# Patient Record
Sex: FEMALE | Race: WHITE | NOT HISPANIC OR LATINO | Employment: FULL TIME | URBAN - NONMETROPOLITAN AREA
[De-identification: names, ages, dates, MRNs, and addresses within clinical notes are randomized per-mention and may not be internally consistent; named-entity substitution may affect disease eponyms.]

---

## 2022-02-21 ENCOUNTER — OFFICE VISIT (OUTPATIENT)
Dept: FAMILY MEDICINE | Facility: CLINIC | Age: 47
End: 2022-02-21
Payer: COMMERCIAL

## 2022-02-21 ENCOUNTER — HOSPITAL ENCOUNTER (OUTPATIENT)
Dept: RADIOLOGY | Facility: HOSPITAL | Age: 47
Discharge: HOME | End: 2022-02-21
Attending: NURSE PRACTITIONER
Payer: COMMERCIAL

## 2022-02-21 VITALS — WEIGHT: 170 LBS | BODY MASS INDEX: 26.68 KG/M2 | HEIGHT: 67 IN

## 2022-02-21 DIAGNOSIS — Z20.822 CLOSE EXPOSURE TO COVID-19 VIRUS: ICD-10-CM

## 2022-02-21 DIAGNOSIS — M54.41 ACUTE BILATERAL LOW BACK PAIN WITH RIGHT-SIDED SCIATICA: ICD-10-CM

## 2022-02-21 DIAGNOSIS — M54.41 ACUTE BILATERAL LOW BACK PAIN WITH RIGHT-SIDED SCIATICA: Primary | ICD-10-CM

## 2022-02-21 LAB
BILIRUB SERPL-MCNC: NEGATIVE MG/DL
BLOOD URINE, POC: NEGATIVE
COLOR, POC UA: NORMAL
CTP QC/QA: YES
GLUCOSE UR QL STRIP: NEGATIVE
KETONES UR QL STRIP: NEGATIVE
LEUKOCYTE ESTERASE URINE, POC: NEGATIVE
NITRITE, POC UA: NEGATIVE
PH, POC UA: 6
PROTEIN, POC: NEGATIVE
SARS-COV-2 AG RESP QL IA.RAPID: NEGATIVE
SPECIFIC GRAVITY, POC UA: 1.01
UROBILINOGEN, POC UA: 0.2

## 2022-02-21 PROCEDURE — 87426 SARS CORONAVIRUS 2 ANTIGEN POCT: ICD-10-PCS | Mod: QW,,, | Performed by: NURSE PRACTITIONER

## 2022-02-21 PROCEDURE — 72100 X-RAY EXAM L-S SPINE 2/3 VWS: CPT | Mod: TC

## 2022-02-21 PROCEDURE — 81003 POCT URINALYSIS W/O SCOPE: ICD-10-PCS | Mod: QW,,, | Performed by: NURSE PRACTITIONER

## 2022-02-21 PROCEDURE — 81003 URINALYSIS AUTO W/O SCOPE: CPT | Mod: QW,,, | Performed by: NURSE PRACTITIONER

## 2022-02-21 PROCEDURE — 87635 SARS-COV-2 COVID-19 AMP PRB: CPT | Mod: ,,, | Performed by: CLINICAL MEDICAL LABORATORY

## 2022-02-21 PROCEDURE — 96372 THER/PROPH/DIAG INJ SC/IM: CPT | Mod: ,,, | Performed by: NURSE PRACTITIONER

## 2022-02-21 PROCEDURE — 87635 SARS-COV-2 (COVID-19) QUALITATIVE PCR: ICD-10-PCS | Mod: ,,, | Performed by: CLINICAL MEDICAL LABORATORY

## 2022-02-21 PROCEDURE — 99204 OFFICE O/P NEW MOD 45 MIN: CPT | Mod: 25,,, | Performed by: NURSE PRACTITIONER

## 2022-02-21 PROCEDURE — 99204 PR OFFICE/OUTPT VISIT, NEW, LEVL IV, 45-59 MIN: ICD-10-PCS | Mod: 25,,, | Performed by: NURSE PRACTITIONER

## 2022-02-21 PROCEDURE — 96372 PR INJECTION,THERAP/PROPH/DIAG2ST, IM OR SUBCUT: ICD-10-PCS | Mod: ,,, | Performed by: NURSE PRACTITIONER

## 2022-02-21 PROCEDURE — 87426 SARSCOV CORONAVIRUS AG IA: CPT | Mod: QW,,, | Performed by: NURSE PRACTITIONER

## 2022-02-21 RX ORDER — METHYLPREDNISOLONE ACETATE 40 MG/ML
40 INJECTION, SUSPENSION INTRA-ARTICULAR; INTRALESIONAL; INTRAMUSCULAR; SOFT TISSUE
Status: COMPLETED | OUTPATIENT
Start: 2022-02-21 | End: 2022-02-21

## 2022-02-21 RX ORDER — KETOROLAC TROMETHAMINE 30 MG/ML
60 INJECTION, SOLUTION INTRAMUSCULAR; INTRAVENOUS
Status: COMPLETED | OUTPATIENT
Start: 2022-02-21 | End: 2022-02-21

## 2022-02-21 RX ORDER — FLUOXETINE HYDROCHLORIDE 20 MG/1
20 CAPSULE ORAL
COMMUNITY
Start: 2021-06-21

## 2022-02-21 RX ORDER — DEXAMETHASONE SODIUM PHOSPHATE 4 MG/ML
4 INJECTION, SOLUTION INTRA-ARTICULAR; INTRALESIONAL; INTRAMUSCULAR; INTRAVENOUS; SOFT TISSUE
Status: COMPLETED | OUTPATIENT
Start: 2022-02-21 | End: 2022-02-21

## 2022-02-21 RX ORDER — GABAPENTIN 100 MG/1
100 CAPSULE ORAL 3 TIMES DAILY PRN
Qty: 30 CAPSULE | Refills: 0 | Status: SHIPPED | OUTPATIENT
Start: 2022-02-21 | End: 2023-02-21

## 2022-02-21 RX ORDER — TELMISARTAN 20 MG/1
20 TABLET ORAL DAILY
COMMUNITY
Start: 2021-12-13

## 2022-02-21 RX ORDER — ALPRAZOLAM 0.5 MG/1
TABLET ORAL
COMMUNITY
Start: 2021-10-27

## 2022-02-21 RX ADMIN — DEXAMETHASONE SODIUM PHOSPHATE 4 MG: 4 INJECTION, SOLUTION INTRA-ARTICULAR; INTRALESIONAL; INTRAMUSCULAR; INTRAVENOUS; SOFT TISSUE at 12:02

## 2022-02-21 RX ADMIN — KETOROLAC TROMETHAMINE 60 MG: 30 INJECTION, SOLUTION INTRAMUSCULAR; INTRAVENOUS at 12:02

## 2022-02-21 RX ADMIN — METHYLPREDNISOLONE ACETATE 40 MG: 40 INJECTION, SUSPENSION INTRA-ARTICULAR; INTRALESIONAL; INTRAMUSCULAR; SOFT TISSUE at 12:02

## 2022-02-21 NOTE — LETTER
February 21, 2022      [facility]  [address] [place] [address]  Phone: [phone]  Fax: [phone]       Patient: Tran Piedra   YOB: 1975  Date of Visit: 02/21/2022    To Whom It May Concern:    KAMILAH Piedra  was at [facility] on 02/21/2022. The patient may return to work/school on 02/24/2022 with no restrictions. If you have any questions or concerns, or if I can be of further assistance, please do not hesitate to contact me.    Sincerely,    NANCY Rivers

## 2022-02-21 NOTE — PROGRESS NOTES
"   [facility] - FAMILY MEDICINE  Phone: [phone]       PATIENT NAME: Tran Piedra   : 1975    AGE: 46 y.o. DATE: 2022    MRN: [id number]        Reason for Visit / Chief Complaint:  exposure to covid-19  (Patient stated that she is here today for back. Patient stated that her pain started yesterday when she got out the shower and the started the in her lower back. Patient stated that her  tested positive for covid on two home test yesterday. )     Subjective:     HPI:  46 yr old WF presents to the office for low back pain since yesterday  Reports bilat low back pain, radiates around to the front and shoots down right leg  Denies any known injury  Also reports covid exposure -  is covid pos at home   States she works at [employer]     Review of Systems:    Pertinent items are above noted in HPI.    Review of patient's allergies indicates:   Allergen Reactions    Penicillins         Med List:  Current Outpatient Medications on File Prior to Visit   Medication Sig Dispense Refill    ALPRAZolam (XANAX) 0.5 MG tablet TAKE 1/2 TO 1 TABLET BY MOUTH TWICE DAILY AS NEEDED      FLUoxetine 20 MG capsule Take 20 mg by mouth.      telmisartan (MICARDIS) 20 MG Tab Take 20 mg by mouth once daily.       No current facility-administered medications on file prior to visit.       Medical/Social/Family History:  Past Medical History:   Diagnosis Date    Anxiety     Hypertension       Social History     Tobacco Use   Smoking Status Current Every Day Smoker    Types: Cigarettes   Smokeless Tobacco Never Used      Social History     Substance and Sexual Activity   Alcohol Use Not Currently       History reviewed. No pertinent family history.   History reviewed. No pertinent surgical history.     Objective:      Vitals:    22 1052   Weight: 77.1 kg (170 lb)   Height: 5' 7" (1.702 m)     Body mass index is 26.63 kg/m².     Physical Exam:    General: well " developed, well nourished    Head: normocephalic, atraumatic    Respiratory: unlabored respirations, no intercostal retractions or accessory muscle use, clear to auscultation without rales or wheezes    Cardiovascular: normal rate and regular rhythm, S1 and S2 normal, no murmurs noted    Abdomen: soft, nontender    Musculoskeletal: negative; full range of motion, no tenderness, palpable spasm or pain on motion    Lymphadenopathy: No cervical or supraclavicular adenopathy    Neurological: normal without focal findings and mental status, speech normal, alert and oriented x3    Skin: Skin color, texture, turgor normal. No rashes or lesions    Psych: no auditory or visual hallucinations, no anxiety, no depression/worrying alot       Assessment:          ICD-10-CM ICD-9-CM   1. Acute bilateral low back pain with right-sided sciatica  M54.41 724.2     724.3     338.19   2. Close exposure to COVID-19 virus  Z20.822 V01.79        Plan:       Acute bilateral low back pain with right-sided sciatica  -     POCT URINALYSIS W/O SCOPE  -     X-Ray Lumbar Spine AP And Lateral; Future; Expected date: 02/21/2022  -     ketorolac injection 60 mg  -     dexamethasone injection 4 mg  -     methylPREDNISolone acetate injection 40 mg    Close exposure to COVID-19 virus  -     SARS Coronavirus 2 Antigen, POCT  -     COVID-19 Routine Screening        Current Outpatient Medications:     ALPRAZolam (XANAX) 0.5 MG tablet, TAKE 1/2 TO 1 TABLET BY MOUTH TWICE DAILY AS NEEDED, Disp: , Rfl:     FLUoxetine 20 MG capsule, Take 20 mg by mouth., Disp: , Rfl:     telmisartan (MICARDIS) 20 MG Tab, Take 20 mg by mouth once daily., Disp: , Rfl:   No current facility-administered medications for this visit.      New & refilled meds:  Requested Prescriptions      No prescriptions requested or ordered in this encounter     Treatment Recommendations:    Orders and follow up as documented in patient record.  Rest, back exercises, stretching, heating pad  prn   Return to clinic as needed.    Signature: Radha German, [facility]

## 2022-02-21 NOTE — PATIENT INSTRUCTIONS
Back Exercise to Keep Fit for Low Back Pain  To help in your recovery and to prevent further back problems, keep your back, abdominal muscles and legs strong. Walk daily as soon as you can. Gradually add other physical activities such as swimming and biking, which can help improve lower back strength. Begin as soon as you can do them comfortably. Do not do any exercises that make your pain a lot worse. The following are some back exercises that can help relieve low back pain.     Pelvic tilt   Repeat 5-10 times, twice per day.  Lie flat on your back (or stand with your back to a wall), knees bent, feet flat on the floor, body relaxed. Tighten your abdominal and buttock muscles, and tilt your pelvis. The curve of the small of your back should flatten towards the floor (or wall). Hold 10 seconds and then relax.       Knee raise     Repeat 5-10 times, twice per day.    Lie flat on your back, knees bent. Bring one knee slowly to your chest. Hug your knee gently. Then lower your leg toward the floor, keeping your knee bent. Do not straighten your legs. Repeat exercise with your other leg.              Partial press-up     Lie face down on a soft, firm surface. Do not turn your head to either side. Rest your arms bent at the elbows alongside your body. Relax for a few minutes. Then raise your upper body enough to lean on your elbows. Relax your lower back and legs as much as possible. Hold this position for 30 seconds at first. Gradually work up to five minutes. Or try slow press-ups. Hold each for five seconds, and repeat five to six times.              Copyright © 2012 by [employer]   Alis AWNG, Katherine CALVILLO, Brandy ELIZABETH, Clint SALVADOR, Calixto R, Bill B, Evans K, Victor Manuel C, Julia B, Bethel S, Xochilt TORREZ, Catracho R. [employer]. Adult Acute and Subacute Low Back Pain. Updated November 2012.

## 2022-02-22 LAB — SARS-COV-2 RNA RESP QL NAA+PROBE: NEGATIVE

## 2022-02-23 ENCOUNTER — OFFICE VISIT (OUTPATIENT)
Dept: FAMILY MEDICINE | Facility: CLINIC | Age: 47
End: 2022-02-23
Payer: COMMERCIAL

## 2022-02-23 ENCOUNTER — PATIENT MESSAGE (OUTPATIENT)
Dept: FAMILY MEDICINE | Facility: CLINIC | Age: 47
End: 2022-02-23
Payer: COMMERCIAL

## 2022-02-23 VITALS
HEART RATE: 74 BPM | HEIGHT: 67 IN | WEIGHT: 170 LBS | OXYGEN SATURATION: 96 % | SYSTOLIC BLOOD PRESSURE: 140 MMHG | BODY MASS INDEX: 26.68 KG/M2 | DIASTOLIC BLOOD PRESSURE: 85 MMHG | TEMPERATURE: 98 F | RESPIRATION RATE: 17 BRPM

## 2022-02-23 DIAGNOSIS — Z20.822 CLOSE EXPOSURE TO COVID-19 VIRUS: ICD-10-CM

## 2022-02-23 DIAGNOSIS — M54.16 LUMBAR RADICULOPATHY, CHRONIC: ICD-10-CM

## 2022-02-23 DIAGNOSIS — M54.41 ACUTE BILATERAL LOW BACK PAIN WITH RIGHT-SIDED SCIATICA: Primary | ICD-10-CM

## 2022-02-23 LAB
CTP QC/QA: YES
SARS-COV-2 AG RESP QL IA.RAPID: NEGATIVE

## 2022-02-23 PROCEDURE — 99213 PR OFFICE/OUTPT VISIT, EST, LEVL III, 20-29 MIN: ICD-10-PCS | Mod: ,,, | Performed by: NURSE PRACTITIONER

## 2022-02-23 PROCEDURE — 99213 OFFICE O/P EST LOW 20 MIN: CPT | Mod: ,,, | Performed by: NURSE PRACTITIONER

## 2022-02-23 PROCEDURE — 87426 SARS CORONAVIRUS 2 ANTIGEN POCT: ICD-10-PCS | Mod: QW,,, | Performed by: NURSE PRACTITIONER

## 2022-02-23 PROCEDURE — 87426 SARSCOV CORONAVIRUS AG IA: CPT | Mod: QW,,, | Performed by: NURSE PRACTITIONER

## 2022-02-23 RX ORDER — HYDROCODONE BITARTRATE AND ACETAMINOPHEN 5; 325 MG/1; MG/1
1 TABLET ORAL EVERY 6 HOURS PRN
Qty: 12 TABLET | Refills: 0 | Status: SHIPPED | OUTPATIENT
Start: 2022-02-23

## 2022-02-23 NOTE — LETTER
February 23, 2022      [facility]  [address] [place] [address]  Phone: [phone]  Fax: [phone]       Patient: Tran Piedra   YOB: 1975  Date of Visit: 02/23/2022    To Whom It May Concern:    KAMILAH Piedra  was at [facility] on 02/23/2022. The patient may return to work/school on 02/27/2022 with no restrictions. If you have any questions or concerns, or if I can be of further assistance, please do not hesitate to contact me.    Sincerely,    NANCY Rivers

## 2022-02-23 NOTE — PROGRESS NOTES
[facility] - FAMILY MEDICINE  Phone: [phone]       PATIENT NAME: Tran Piedra   : 1975    AGE: 46 y.o. DATE: 2022    MRN: [id number]        Reason for Visit / Chief Complaint:  Sore Throat and Back Pain (Patient is here today for back pain and exposure to COVID. Patient stated that she still in pain and now has no tasted or smell. )     Subjective:     HPI:  46 yr old WF presents to the office for continued back pain x 1 week   States she cannot bend over or pick anyting up   Seen earlier this week -given toradol IM, , gabapentin  Constant aching, worse with movement  Feels like sharp, burning pain radiating down right leg     Review of Systems:    Pertinent items are above noted in HPI.    Review of patient's allergies indicates:   Allergen Reactions    Penicillins         Med List:  Current Outpatient Medications on File Prior to Visit   Medication Sig Dispense Refill    ALPRAZolam (XANAX) 0.5 MG tablet TAKE 1/2 TO 1 TABLET BY MOUTH TWICE DAILY AS NEEDED      FLUoxetine 20 MG capsule Take 20 mg by mouth.      gabapentin (NEURONTIN) 100 MG capsule Take 1 capsule (100 mg total) by mouth 3 (three) times daily as needed (nerve pain). 30 capsule 0    telmisartan (MICARDIS) 20 MG Tab Take 20 mg by mouth once daily.       No current facility-administered medications on file prior to visit.       Medical/Social/Family History:  Past Medical History:   Diagnosis Date    Anxiety     Hypertension       Social History     Tobacco Use   Smoking Status Current Every Day Smoker    Types: Cigarettes   Smokeless Tobacco Never Used      Social History     Substance and Sexual Activity   Alcohol Use Not Currently       History reviewed. No pertinent family history.   History reviewed. No pertinent surgical history.     Objective:      Vitals:    22 1543   BP: (!) 140/85   BP Location: Left arm   Patient Position: Sitting   Pulse: 74   Resp: 17   Temp: 98 °F  "(36.7 °C)   SpO2: 96%   Weight: 77.1 kg (170 lb)   Height: 5' 7" (1.702 m)     Body mass index is 26.63 kg/m².     Physical Exam:    General: well developed, well nourished    Head: normocephalic, atraumatic    Respiratory: unlabored respirations, no intercostal retractions or accessory muscle use, clear to auscultation without rales or wheezes    Cardiovascular: normal rate and regular rhythm, S1 and S2 normal, no murmurs noted    Abdomen: soft, nontender    Musculoskeletal: negative; full range of motion, no tenderness, palpable spasm or pain on motion    Lymphadenopathy: No cervical or supraclavicular adenopathy    Neurological: normal without focal findings and mental status, speech normal, alert and oriented x3    Skin: Skin color, texture, turgor normal. No rashes or lesions    Psych: no auditory or visual hallucinations, no anxiety, no depression/worrying alot       Assessment:          ICD-10-CM ICD-9-CM   1. Acute bilateral low back pain with right-sided sciatica  M54.41 724.2     724.3     338.19   2. Close exposure to COVID-19 virus  Z20.822 V01.79   3. Lumbar radiculopathy, chronic  M54.16 724.4        Plan:       Acute bilateral low back pain with right-sided sciatica  -     HYDROcodone-acetaminophen (NORCO) 5-325 mg per tablet; Take 1 tablet by mouth every 6 (six) hours as needed for Pain.  Dispense: 12 tablet; Refill: 0    Close exposure to COVID-19 virus  -     SARS Coronavirus 2 Antigen, POCT    Lumbar radiculopathy, chronic  -     MRI Lumbar Spine Without Contrast; Future; Expected date: 02/23/2022        Current Outpatient Medications:     ALPRAZolam (XANAX) 0.5 MG tablet, TAKE 1/2 TO 1 TABLET BY MOUTH TWICE DAILY AS NEEDED, Disp: , Rfl:     FLUoxetine 20 MG capsule, Take 20 mg by mouth., Disp: , Rfl:     gabapentin (NEURONTIN) 100 MG capsule, Take 1 capsule (100 mg total) by mouth 3 (three) times daily as needed (nerve pain)., Disp: 30 capsule, Rfl: 0    HYDROcodone-acetaminophen (NORCO) " 5-325 mg per tablet, Take 1 tablet by mouth every 6 (six) hours as needed for Pain., Disp: 12 tablet, Rfl: 0    telmisartan (MICARDIS) 20 MG Tab, Take 20 mg by mouth once daily., Disp: , Rfl:       New & refilled meds:  Requested Prescriptions     Signed Prescriptions Disp Refills    HYDROcodone-acetaminophen (NORCO) 5-325 mg per tablet 12 tablet 0     Sig: Take 1 tablet by mouth every 6 (six) hours as needed for Pain.     Treatment Recommendations:    Orders and follow up as documented in patient record.    Return to clinic as needed.    Signature: Clara Escobedo for HPI/ROS submitted by the patient on 2/23/2022  Chronicity: recurrent  Onset: in the past 7 days  Frequency: constantly  Progression since onset: unchanged  Pain location: sacro-iliac  Pain quality: aching, burning, shooting  Radiates to: right thigh  Pain - numeric: 10/10  Pain is: the same all the time  Aggravated by: bending, coughing, position, lying down, sitting, standing, twisting  Stiffness is present: all day  abdominal pain: Yes  pelvic pain: Yes  Pain severity: severe  Improvement on treatment: no relief

## 2022-02-27 ENCOUNTER — HOSPITAL ENCOUNTER (EMERGENCY)
Facility: HOSPITAL | Age: 47
Discharge: HOME | End: 2022-02-27
Payer: COMMERCIAL

## 2022-02-27 VITALS
BODY MASS INDEX: 26.68 KG/M2 | HEART RATE: 71 BPM | TEMPERATURE: 98 F | WEIGHT: 170 LBS | HEIGHT: 67 IN | SYSTOLIC BLOOD PRESSURE: 148 MMHG | OXYGEN SATURATION: 99 % | RESPIRATION RATE: 18 BRPM | DIASTOLIC BLOOD PRESSURE: 99 MMHG

## 2022-02-27 DIAGNOSIS — M62.838 MUSCLE SPASM: ICD-10-CM

## 2022-02-27 DIAGNOSIS — M54.10 BACK PAIN WITH RIGHT-SIDED RADICULOPATHY: Primary | ICD-10-CM

## 2022-02-27 PROCEDURE — 96372 THER/PROPH/DIAG INJ SC/IM: CPT

## 2022-02-27 PROCEDURE — 99283 PR EMERGENCY DEPT VISIT,LEVEL III: ICD-10-PCS | Mod: ,,, | Performed by: NURSE PRACTITIONER

## 2022-02-27 PROCEDURE — 99284 EMERGENCY DEPT VISIT MOD MDM: CPT

## 2022-02-27 PROCEDURE — 99283 EMERGENCY DEPT VISIT LOW MDM: CPT | Mod: ,,, | Performed by: NURSE PRACTITIONER

## 2022-02-27 PROCEDURE — 63600175 PHARM REV CODE 636 W HCPCS: Performed by: NURSE PRACTITIONER

## 2022-02-27 RX ORDER — CYCLOBENZAPRINE HCL 10 MG
10 TABLET ORAL 3 TIMES DAILY PRN
Qty: 15 TABLET | Refills: 0 | Status: SHIPPED | OUTPATIENT
Start: 2022-02-27 | End: 2022-03-04

## 2022-02-27 RX ORDER — ORPHENADRINE CITRATE 30 MG/ML
60 INJECTION INTRAMUSCULAR; INTRAVENOUS
Status: COMPLETED | OUTPATIENT
Start: 2022-02-27 | End: 2022-02-27

## 2022-02-27 RX ORDER — KETOROLAC TROMETHAMINE 30 MG/ML
60 INJECTION, SOLUTION INTRAMUSCULAR; INTRAVENOUS
Status: COMPLETED | OUTPATIENT
Start: 2022-02-27 | End: 2022-02-27

## 2022-02-27 RX ADMIN — KETOROLAC TROMETHAMINE 60 MG: 30 INJECTION, SOLUTION INTRAMUSCULAR at 09:02

## 2022-02-27 RX ADMIN — ORPHENADRINE CITRATE 60 MG: 30 INJECTION INTRAMUSCULAR; INTRAVENOUS at 09:02

## 2022-02-27 NOTE — Clinical Note
"Tran"LAURY Piedra was seen and treated in our emergency department on 2/27/2022.  She may return to work on 03/01/2022.       If you have any questions or concerns, please don't hesitate to call.      NANCY Cordero"

## 2022-02-27 NOTE — ED PROVIDER NOTES
"Encounter Date: 2/27/2022       History     Chief Complaint   Patient presents with    Back Pain     X 1 week; states started when she "turned" and felt something in her back; pain has continued x 1 week despite seeing pcp with meds prescribed; states has an MRI scheduled in 3 days     Tran Piedra is a 47 y/o WF who presents to the ED with complaint of lower back pain that radiates down right leg to knee that started one week ago. States that she turned wrong in the shower and felt pull in back. Back pain is constant aching with sharp shooting pain at times that is worse with movement. Rates pain a 10 on 1-10 pain scale. Minimal relief of pain with current pain medication regimen of Aleve, Neurontin and Norco for pain medication    She was seen by her PCP on 02/21, back xray revealed  Normal alignment in the lateral plane of L5-S1, minimal osteophytes without focal vertebral body height loss seen, mild lower lumbar facet arthropathy noted. She was given Toradol and steroid injection in clinic on Monday, seen again on 02/23 for continued back pain. She is scheduled for MRI tomorrow.             Review of patient's allergies indicates:   Allergen Reactions    Penicillins      Past Medical History:   Diagnosis Date    Anxiety     Hypertension      Past Surgical History:   Procedure Laterality Date    CARPAL TUNNEL RELEASE Right     CHOLECYSTECTOMY      HYSTERECTOMY       History reviewed. No pertinent family history.  Social History     Tobacco Use    Smoking status: Current Every Day Smoker     Types: Cigarettes    Smokeless tobacco: Never Used   Substance Use Topics    Alcohol use: Yes     Comment: socially    Drug use: Never     Review of Systems   Constitutional: Positive for activity change. Negative for diaphoresis and fever.        Decreased activity related to back pain.   HENT: Negative.    Eyes: Negative.    Respiratory: Negative.    Cardiovascular: Negative.    Gastrointestinal: Negative.  "   Genitourinary: Negative.  Negative for dysuria and frequency.   Musculoskeletal: Positive for back pain, gait problem and myalgias.        Back pain radiates down RLE to knee.   Skin: Negative.  Negative for rash.   Neurological: Positive for numbness. Negative for weakness.   Hematological: Negative.    Psychiatric/Behavioral: Negative.        Physical Exam     Initial Vitals [02/27/22 0849]   BP Pulse Resp Temp SpO2   (!) 148/99 71 18 97.6 °F (36.4 °C) 99 %      MAP       --         Physical Exam    Nursing note and vitals reviewed.  Constitutional: She appears well-developed.   HENT:   Head: Normocephalic and atraumatic.   Neck: Neck supple.   Normal range of motion.  Cardiovascular: Normal rate, regular rhythm, normal heart sounds and intact distal pulses.   Pulmonary/Chest: Breath sounds normal.   Abdominal: Abdomen is soft. There is no abdominal tenderness.   Musculoskeletal:      Cervical back: Normal, normal range of motion and neck supple.      Lumbar back: Spasms and tenderness present. No swelling, edema, deformity or bony tenderness. Decreased range of motion. Positive right straight leg raise test and positive left straight leg raise test.      Comments: Limited flexion, extension, lateral bending and rotation related to pain.      Neurological: She is alert and oriented to person, place, and time. She has normal strength. A cranial nerve deficit is present. No sensory deficit.   Skin: Skin is warm and dry. Capillary refill takes less than 2 seconds. No rash noted.   Psychiatric: She has a normal mood and affect. Her behavior is normal. Judgment and thought content normal.         Medical Screening Exam   See Full Note    ED Course   Procedures  Labs Reviewed - No data to display       Imaging Results    None          Medications   ketorolac injection 60 mg (60 mg Intramuscular Given 2/27/22 0911)   orphenadrine injection 60 mg (60 mg Intramuscular Given 2/27/22 0911)     Medical Decision Making:   ED  Management:  Toradol and Norflex given in ED. Pain improved. Will give prescription for flexeril to help with muscle spasms. Instructed on alternating ice with moist heat.  Reviewed discharge instructions. Instructed on meaures to reduce pain. She agreed to treatment plan and verbalized understanding.                    Clinical Impression:   Final diagnoses:  [M54.10] Back pain with right-sided radiculopathy (Primary)  [M62.838] Muscle spasm          ED Disposition Condition    Discharge Stable        ED Prescriptions     Medication Sig Dispense Start Date End Date Auth. Provider    cyclobenzaprine (FLEXERIL) 10 MG tablet Take 1 tablet (10 mg total) by mouth 3 (three) times daily as needed for Muscle spasms. 15 tablet 2/27/2022 3/4/2022 NANCY Cordero        Follow-up Information     Follow up With Specialties Details Why Contact Info    NANCY [facility] Call  to schedule follow up appointment. Keep MRI appointment tomorrow as scheduled [address]  [facility] [place] [address]  [phone]             NANCY Cordero  02/27/22 0919       NANCY Cordero  02/27/22 0922

## 2022-02-27 NOTE — DISCHARGE INSTRUCTIONS
-Ice pack 15-20 min to affected area, alternate with moist heat  -Biofreeze spray or tiger balm over the counter as directed  -Flexeril (muscle spasms medication), do not take while working or driving will cause drowsiness.     Keep MRI appointment as scheduled for tomorrow.

## 2022-02-28 ENCOUNTER — TELEPHONE (OUTPATIENT)
Dept: EMERGENCY MEDICINE | Facility: HOSPITAL | Age: 47
End: 2022-02-28
Payer: COMMERCIAL

## 2022-02-28 NOTE — TELEPHONE ENCOUNTER
PT STATES SHOTS HELPED MINIMALLY FOR A FEW HOURS BUT PAIN IS BACK. SAYS SHE JUST WANTS TO GET THE MRI WED AND SEE WHY SHE IS HURTING SO BAD.

## 2022-03-02 ENCOUNTER — HOSPITAL ENCOUNTER (OUTPATIENT)
Dept: RADIOLOGY | Facility: HOSPITAL | Age: 47
Discharge: HOME | End: 2022-03-02
Attending: NURSE PRACTITIONER
Payer: COMMERCIAL

## 2022-03-02 DIAGNOSIS — M54.16 LUMBAR RADICULOPATHY, CHRONIC: ICD-10-CM

## 2022-03-02 PROCEDURE — 72148 MRI LUMBAR SPINE W/O DYE: CPT | Mod: TC

## 2022-03-03 ENCOUNTER — OFFICE VISIT (OUTPATIENT)
Dept: FAMILY MEDICINE | Facility: CLINIC | Age: 47
End: 2022-03-03
Payer: COMMERCIAL

## 2022-03-03 VITALS
HEART RATE: 74 BPM | SYSTOLIC BLOOD PRESSURE: 145 MMHG | OXYGEN SATURATION: 98 % | WEIGHT: 173.19 LBS | DIASTOLIC BLOOD PRESSURE: 100 MMHG | BODY MASS INDEX: 27.18 KG/M2 | HEIGHT: 67 IN | TEMPERATURE: 98 F | RESPIRATION RATE: 18 BRPM

## 2022-03-03 DIAGNOSIS — M51.36 BULGING OF LUMBAR INTERVERTEBRAL DISC: Primary | ICD-10-CM

## 2022-03-03 PROCEDURE — 99212 PR OFFICE/OUTPT VISIT, EST, LEVL II, 10-19 MIN: ICD-10-PCS | Mod: ,,, | Performed by: NURSE PRACTITIONER

## 2022-03-03 PROCEDURE — 99212 OFFICE O/P EST SF 10 MIN: CPT | Mod: ,,, | Performed by: NURSE PRACTITIONER

## 2022-03-03 NOTE — PROGRESS NOTES
[facility] - FAMILY MEDICINE  Phone: [phone]       PATIENT NAME: Tran Piedra   : 1975    AGE: 46 y.o. DATE: 2022    MRN: [id number]        Reason for Visit / Chief Complaint:  Follow-up and Back Pain (Patient is here today for a follow up with her MRI. )     Subjective:     HPI:  46 yr old WF presents to the office for f/u regarding MRI   Mri showed bulging discs - patient requests referral to Dr. Shrestha  Has taken 2 weeks off work already without relief     Review of Systems:    Pertinent items are above noted in HPI.    Review of patient's allergies indicates:   Allergen Reactions    Penicillins         Med List:  Current Outpatient Medications on File Prior to Visit   Medication Sig Dispense Refill    ALPRAZolam (XANAX) 0.5 MG tablet TAKE 1/2 TO 1 TABLET BY MOUTH TWICE DAILY AS NEEDED      cyclobenzaprine (FLEXERIL) 10 MG tablet Take 1 tablet (10 mg total) by mouth 3 (three) times daily as needed for Muscle spasms. 15 tablet 0    FLUoxetine 20 MG capsule Take 20 mg by mouth.      gabapentin (NEURONTIN) 100 MG capsule Take 1 capsule (100 mg total) by mouth 3 (three) times daily as needed (nerve pain). 30 capsule 0    HYDROcodone-acetaminophen (NORCO) 5-325 mg per tablet Take 1 tablet by mouth every 6 (six) hours as needed for Pain. 12 tablet 0    telmisartan (MICARDIS) 20 MG Tab Take 20 mg by mouth once daily.       No current facility-administered medications on file prior to visit.       Medical/Social/Family History:  Past Medical History:   Diagnosis Date    Anxiety     Hypertension       Social History     Tobacco Use   Smoking Status Current Every Day Smoker    Types: Cigarettes   Smokeless Tobacco Never Used      Social History     Substance and Sexual Activity   Alcohol Use Yes    Comment: socially       No family history on file.   Past Surgical History:   Procedure Laterality Date    CARPAL TUNNEL RELEASE Right     CHOLECYSTECTOMY   "    HYSTERECTOMY          Objective:      Vitals:    03/03/22 1355 03/03/22 1359   BP: (!) 160/99 (!) 145/100   BP Location: Left arm Right arm   Patient Position: Sitting Sitting   Pulse: 74    Resp: 18    Temp: 98 °F (36.7 °C)    SpO2: 98%    Weight: 78.6 kg (173 lb 3.2 oz)    Height: 5' 7" (1.702 m)      Body mass index is 27.13 kg/m².     Physical Exam:    General: well developed, well nourished    Head: normocephalic, atraumatic    Neck: supple, symmetrical, trachea midline, no adenopathy and thyroid: not enlarged, symmetric, no tenderness/mass/nodules    Respiratory: unlabored respirations, no intercostal retractions or accessory muscle use, clear to auscultation without rales or wheezes    Cardiovascular: normal rate and regular rhythm, S1 and S2 normal, no murmurs noted    Abdomen: soft, nontender    Musculoskeletal: negative; full range of motion, no tenderness, palpable spasm or pain on motion    Lymphadenopathy: No cervical or supraclavicular adenopathy    Neurological: normal without focal findings and mental status, speech normal, alert and oriented x3    Skin: Skin color, texture, turgor normal. No rashes or lesions    Psych: no auditory or visual hallucinations, no anxiety, no depression/worrying alot       Assessment:          ICD-10-CM ICD-9-CM   1. Bulging of lumbar intervertebral disc  M51.26 722.10        Plan:       Bulging of lumbar intervertebral disc  -     Ambulatory referral/consult to [facility]; Future; Expected date: 03/10/2022        Current Outpatient Medications:     ALPRAZolam (XANAX) 0.5 MG tablet, TAKE 1/2 TO 1 TABLET BY MOUTH TWICE DAILY AS NEEDED, Disp: , Rfl:     cyclobenzaprine (FLEXERIL) 10 MG tablet, Take 1 tablet (10 mg total) by mouth 3 (three) times daily as needed for Muscle spasms., Disp: 15 tablet, Rfl: 0    FLUoxetine 20 MG capsule, Take 20 mg by mouth., Disp: , Rfl:     gabapentin (NEURONTIN) 100 MG capsule, Take 1 capsule (100 mg total) by mouth 3 " (three) times daily as needed (nerve pain)., Disp: 30 capsule, Rfl: 0    HYDROcodone-acetaminophen (NORCO) 5-325 mg per tablet, Take 1 tablet by mouth every 6 (six) hours as needed for Pain., Disp: 12 tablet, Rfl: 0    telmisartan (MICARDIS) 20 MG Tab, Take 20 mg by mouth once daily., Disp: , Rfl:         New & refilled meds:  Requested Prescriptions      No prescriptions requested or ordered in this encounter     Treatment Recommendations:    Orders and follow up as documented in patient record.    Return to clinic as needed.    Signature: Clara Escobedo for HPI/ROS submitted by the patient on 3/3/2022  Chronicity: recurrent  Onset: in the past 7 days  Frequency: constantly  Progression since onset: unchanged  Pain location: sacro-iliac  Pain quality: aching, burning, cramping, shooting, stabbing  Radiates to: right thigh  Pain - numeric: 9/10  Pain is: the same all the time  Aggravated by: bending, coughing, position, lying down, sitting, standing, stress, twisting  Stiffness is present: all day  abdominal pain: Yes  pelvic pain: Yes  weakness: Yes  Pain severity: severe  Improvement on treatment: no relief    Agree with plan

## 2022-03-03 NOTE — LETTER
March 3, 2022      [facility]  [address] [place] [address]  Phone: [phone]  Fax: [phone]       Patient: Tran Piedra   YOB: 1975  Date of Visit: 03/03/2022    To Whom It May Concern:    KAMILAH Piedra  was at [facility] on 03/03/2022. The patient may return to work/school on 03/07/2022 with no restrictions. If you have any questions or concerns, or if I can be of further assistance, please do not hesitate to contact me.    Sincerely,    NANCY Rivers      36.4